# Patient Record
(demographics unavailable — no encounter records)

---

## 2024-10-09 NOTE — HISTORY OF PRESENT ILLNESS
[FreeTextEntry1] : Ms. Pearl is a pleasant 34yo female who presents today with a chief complaint of carotid body tumor.  She has felt a mass on the left side of her neck for 5-6 years.  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  She saw Dr. Matute and is scheduled for resection on 10/30/24.  The mass is non-painful, no difficulty swallowing.   Denies headaches, dizziness, visual scintillations, episodes of visual loss or blurring, diplopia, hearing changes, tinnitus, speech problems, swallowing difficulties, numbness, tingling, weakness, tremors, twitches, seizures, imbalance or coordination difficulties

## 2024-10-09 NOTE — HISTORY OF PRESENT ILLNESS
[FreeTextEntry1] : Ms. Pearl is a pleasant 32yo female who presents today with a chief complaint of carotid body tumor.  She has felt a mass on the left side of her neck for 5-6 years.  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  She saw Dr. Matute and is scheduled for resection on 10/30/24.  The mass is non-painful, no difficulty swallowing.   Denies headaches, dizziness, visual scintillations, episodes of visual loss or blurring, diplopia, hearing changes, tinnitus, speech problems, swallowing difficulties, numbness, tingling, weakness, tremors, twitches, seizures, imbalance or coordination difficulties

## 2025-05-20 NOTE — HISTORY OF PRESENT ILLNESS
[de-identified] : 34 yro pt was referred by Dr. Ibrahim for eval of R neck mass. CT [place]28[place] [facility] [place] 2.1 x 1.3 x 3.0cm avidly enhancing mass located at the Right carotid bifurcation, most consistent with carotid body paraganglioma. 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 Today pt reports the first bite syndrome is completely gone.  Chewing without pain.  Pt reports pain 1 week ago behind the R ear which lasted about 5 days. The pain had started after she got off an airplane. Associated with tingling sensation on R jaw. These symptoms are no longer present.   Today pt denies fever, facial swelling, dysphagia, dyspnea, or dysphonia.

## 2025-05-20 NOTE — PLAN
[TextEntry] : - 3 cm R carotid body tumor. Patient thinks it has been growing. Reviewed images with the patient today and recommended surgical resection. 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 Discussed scans with neurotology and we decided to go with surveillance and possible gamma knife if signs of growth. - S/P surgery on 10/30/24. - First bite syndrome resolved.  - No hearing changes but has some pulsatile tinnitus. Occasional ear pressure. Otoscopy today shows signs of a vascular mass in the R middle ear. - Will repeat scans in September for the glomus jugulare. Patient having difficulty following up with endocrinology and will try to find someone in [place]. - Return in 6 months.

## 2025-05-20 NOTE — REASON FOR VISIT
[Subsequent Evaluation] : a subsequent evaluation for [FreeTextEntry2] : s/p Resection of right carotid body tumor on 10/30/24

## 2025-05-20 NOTE — CONSULT LETTER
[Dear  ___] : Dear  [unfilled], [Consult Letter:] : I had the pleasure of evaluating your patient, [unfilled]. [Please see my note below.] : Please see my note below. [Consult Closing:] : Thank you very much for allowing me to participate in the care of this patient.  If you have any questions, please do not hesitate to contact me. [Sincerely,] : Sincerely, [FreeTextEntry2] : Dr Ibrahim [FreeTextEntry3] :  Shashank Rivera MD, FACS  Otolaryngology-Head and Neck Surgery [place] [facility] [employer] [facility]

## 2025-05-20 NOTE — PHYSICAL EXAM
[Midline] : trachea located in midline position [Normal] : no rashes [de-identified] : R level 2 nodule